# Patient Record
Sex: MALE | Race: WHITE
[De-identification: names, ages, dates, MRNs, and addresses within clinical notes are randomized per-mention and may not be internally consistent; named-entity substitution may affect disease eponyms.]

---

## 2017-11-17 ENCOUNTER — HOSPITAL ENCOUNTER (OUTPATIENT)
Dept: HOSPITAL 92 - RAD | Age: 57
Discharge: HOME | End: 2017-11-17
Attending: UROLOGY
Payer: COMMERCIAL

## 2017-11-17 DIAGNOSIS — R35.0: ICD-10-CM

## 2017-11-17 DIAGNOSIS — Z00.00: ICD-10-CM

## 2017-11-17 DIAGNOSIS — K80.80: ICD-10-CM

## 2017-11-17 DIAGNOSIS — N20.0: ICD-10-CM

## 2017-11-17 DIAGNOSIS — Z12.5: Primary | ICD-10-CM

## 2017-11-17 LAB
ALP SERPL-CCNC: 53 U/L (ref 40–150)
ALT SERPL W P-5'-P-CCNC: 19 U/L (ref 8–55)
AST SERPL-CCNC: 19 U/L (ref 5–34)
BASOPHILS # BLD AUTO: 0.1 THOU/UL (ref 0–0.2)
BASOPHILS NFR BLD AUTO: 1.4 % (ref 0–1)
BILIRUB DIRECT SERPL-MCNC: 0.3 MG/DL (ref 0.1–0.3)
BILIRUB SERPL-MCNC: 0.8 MG/DL (ref 0.2–1.2)
CHOLEST SERPL-MCNC: 158 MG/DL
EOSINOPHIL # BLD AUTO: 0.3 THOU/UL (ref 0–0.7)
EOSINOPHIL NFR BLD AUTO: 3 % (ref 0–10)
HCT VFR BLD CALC: 46.2 % (ref 42–52)
HYALINE CASTS #/AREA URNS LPF: (no result) LPF
LDLC SERPL CALC-MCNC: 86 MG/DL
LYMPHOCYTES # BLD: 3.4 THOU/UL (ref 1.2–3.4)
LYMPHOCYTES NFR BLD AUTO: 35.8 % (ref 21–51)
MONOCYTES # BLD AUTO: 0.7 THOU/UL (ref 0.11–0.59)
MONOCYTES NFR BLD AUTO: 7.1 % (ref 0–10)
NEUTROPHILS # BLD AUTO: 5 THOU/UL (ref 1.4–6.5)
RBC # BLD AUTO: 4.87 MILL/UL (ref 4.7–6.1)
RBC UR QL AUTO: (no result) HPF (ref 0–3)
TESTOST FREE SERPL-MCNC: 92.4 PG/ML (ref 47–244)
WBC # BLD AUTO: 9.5 THOU/UL (ref 4.8–10.8)

## 2017-11-17 PROCEDURE — 83036 HEMOGLOBIN GLYCOSYLATED A1C: CPT

## 2017-11-17 PROCEDURE — 80076 HEPATIC FUNCTION PANEL: CPT

## 2017-11-17 PROCEDURE — 84439 ASSAY OF FREE THYROXINE: CPT

## 2017-11-17 PROCEDURE — 85025 COMPLETE CBC W/AUTO DIFF WBC: CPT

## 2017-11-17 PROCEDURE — 87086 URINE CULTURE/COLONY COUNT: CPT

## 2017-11-17 PROCEDURE — 80061 LIPID PANEL: CPT

## 2017-11-17 PROCEDURE — G0103 PSA SCREENING: HCPCS

## 2017-11-17 PROCEDURE — 74000: CPT

## 2017-11-17 PROCEDURE — 81001 URINALYSIS AUTO W/SCOPE: CPT

## 2017-11-17 PROCEDURE — 84270 ASSAY OF SEX HORMONE GLOBUL: CPT

## 2017-11-17 PROCEDURE — 84443 ASSAY THYROID STIM HORMONE: CPT

## 2017-11-17 PROCEDURE — 84403 ASSAY OF TOTAL TESTOSTERONE: CPT

## 2017-11-17 PROCEDURE — 36415 COLL VENOUS BLD VENIPUNCTURE: CPT

## 2018-11-27 ENCOUNTER — HOSPITAL ENCOUNTER (OUTPATIENT)
Dept: HOSPITAL 92 - RAD | Age: 58
Discharge: HOME | End: 2018-11-27
Attending: UROLOGY
Payer: COMMERCIAL

## 2018-11-27 DIAGNOSIS — R19.8: ICD-10-CM

## 2018-11-27 DIAGNOSIS — Z12.5: Primary | ICD-10-CM

## 2018-11-27 DIAGNOSIS — N20.0: ICD-10-CM

## 2018-11-27 DIAGNOSIS — R35.0: ICD-10-CM

## 2018-11-27 PROCEDURE — 81003 URINALYSIS AUTO W/O SCOPE: CPT

## 2018-11-27 PROCEDURE — G0103 PSA SCREENING: HCPCS

## 2018-11-27 PROCEDURE — 80048 BASIC METABOLIC PNL TOTAL CA: CPT

## 2018-11-27 PROCEDURE — 36415 COLL VENOUS BLD VENIPUNCTURE: CPT

## 2018-11-27 PROCEDURE — 87086 URINE CULTURE/COLONY COUNT: CPT

## 2018-11-27 PROCEDURE — 74018 RADEX ABDOMEN 1 VIEW: CPT

## 2018-11-27 NOTE — RAD
ABDOMEN ONE VIEW:

 

COMPARISON:

11/17/2017

 

FINDINGS:

Stable calcification projecting over the right upper quadrant, which may be associated with the right
 renal pelvis versus gallstones.

 

There are a few nonspecific air-filled loops of small bowel, which are neither dilated nor distended.
 No pneumoperitoneum on the supine projection.

 

IMPRESSION:

Separate calcifications projecting over the right upper quadrant, which may represent calculi in the 
right renal pelvis versus gallstones.  These calculi measure approximately 1.6 and 1.7 cm.

 

POS: [facility]

## 2019-01-08 ENCOUNTER — HOSPITAL ENCOUNTER (OUTPATIENT)
Dept: HOSPITAL 92 - SCSULT | Age: 59
Discharge: HOME | End: 2019-01-08
Attending: SURGERY
Payer: COMMERCIAL

## 2019-01-08 DIAGNOSIS — R73.01: ICD-10-CM

## 2019-01-08 DIAGNOSIS — Z11.59: Primary | ICD-10-CM

## 2019-01-08 DIAGNOSIS — R10.11: ICD-10-CM

## 2019-01-08 DIAGNOSIS — K80.20: ICD-10-CM

## 2019-01-08 DIAGNOSIS — E78.5: ICD-10-CM

## 2019-01-08 DIAGNOSIS — K76.0: ICD-10-CM

## 2019-01-08 LAB
ALBUMIN SERPL BCG-MCNC: 4.4 G/DL (ref 3.5–5)
ALP SERPL-CCNC: 44 U/L (ref 40–150)
ALT SERPL W P-5'-P-CCNC: 20 U/L (ref 8–55)
AST SERPL-CCNC: 18 U/L (ref 5–34)
BILIRUB DIRECT SERPL-MCNC: 0.2 MG/DL (ref 0.1–0.3)
BILIRUB SERPL-MCNC: 0.7 MG/DL (ref 0.2–1.2)
CHD RISK SERPL-RTO: 5.6 (ref ?–4.5)
CHOLEST SERPL-MCNC: 256 MG/DL
HDLC SERPL-MCNC: 46 MG/DL
LDLC SERPL CALC-MCNC: 170 MG/DL
TRIGL SERPL-MCNC: 198 MG/DL (ref ?–150)

## 2019-01-08 PROCEDURE — 76705 ECHO EXAM OF ABDOMEN: CPT

## 2019-01-08 PROCEDURE — 80061 LIPID PANEL: CPT

## 2019-01-08 PROCEDURE — 83036 HEMOGLOBIN GLYCOSYLATED A1C: CPT

## 2019-01-08 PROCEDURE — 80076 HEPATIC FUNCTION PANEL: CPT

## 2019-01-08 PROCEDURE — 87521 HEPATITIS C PROBE&RVRS TRNSC: CPT

## 2019-01-08 PROCEDURE — 36415 COLL VENOUS BLD VENIPUNCTURE: CPT

## 2019-01-08 NOTE — ULT
GALLBLADDER ULTRASOUND:

 

History: Right upper quadrant pain. 

 

FINDINGS: 

Real-time imaging of the right upper quadrant shows an echogenic foci with shadowing within the gallb
ladder consistent with gallstones. Common duct is 4 mm. 

 

The liver is of increased echogenicity and measures 19.5 cm in length. Technologist reports a negativ
e ultrasound Street's sign. 

 

Pancreas is partially obscured. Right kidney is normal in size and not obstructed. 

 

IMPRESSION: 

1. Multiple cholelithiasis with a normal caliber common duct. 

2. Fatty changes of the liver which is borderline in size. 

 

POS: [facility]

## 2019-01-28 ENCOUNTER — HOSPITAL ENCOUNTER (OUTPATIENT)
Dept: HOSPITAL 92 - LABBT | Age: 59
Discharge: HOME | End: 2019-01-28
Attending: SURGERY
Payer: COMMERCIAL

## 2019-01-28 DIAGNOSIS — K80.20: ICD-10-CM

## 2019-01-28 DIAGNOSIS — Z01.818: Primary | ICD-10-CM

## 2019-01-28 LAB
ALBUMIN SERPL BCG-MCNC: 4.5 G/DL (ref 3.5–5)
ALP SERPL-CCNC: 49 U/L (ref 40–150)
ALT SERPL W P-5'-P-CCNC: 20 U/L (ref 8–55)
ANION GAP SERPL CALC-SCNC: 11 MMOL/L (ref 10–20)
AST SERPL-CCNC: 19 U/L (ref 5–34)
BASOPHILS # BLD AUTO: 0.2 THOU/UL (ref 0–0.2)
BASOPHILS NFR BLD AUTO: 2 % (ref 0–1)
BILIRUB DIRECT SERPL-MCNC: 0.1 MG/DL (ref 0.1–0.3)
BILIRUB SERPL-MCNC: 0.4 MG/DL (ref 0.2–1.2)
BUN SERPL-MCNC: 22 MG/DL (ref 8.4–25.7)
CALCIUM SERPL-MCNC: 9.9 MG/DL (ref 7.8–10.44)
CHLORIDE SERPL-SCNC: 105 MMOL/L (ref 98–107)
CO2 SERPL-SCNC: 27 MMOL/L (ref 22–29)
CREAT CL PREDICTED SERPL C-G-VRATE: 0 ML/MIN (ref 70–130)
EOSINOPHIL # BLD AUTO: 0.3 THOU/UL (ref 0–0.7)
EOSINOPHIL NFR BLD AUTO: 3.2 % (ref 0–10)
GLOBULIN SER CALC-MCNC: 3.2 G/DL (ref 2.4–3.5)
GLUCOSE SERPL-MCNC: 109 MG/DL (ref 70–105)
HGB BLD-MCNC: 15.6 G/DL (ref 14–18)
LYMPHOCYTES # BLD: 2.6 THOU/UL (ref 1.2–3.4)
LYMPHOCYTES NFR BLD AUTO: 33.4 % (ref 21–51)
MCH RBC QN AUTO: 30.2 PG (ref 27–31)
MCV RBC AUTO: 92.8 FL (ref 78–98)
MONOCYTES # BLD AUTO: 0.6 THOU/UL (ref 0.11–0.59)
MONOCYTES NFR BLD AUTO: 8 % (ref 0–10)
NEUTROPHILS # BLD AUTO: 4.2 THOU/UL (ref 1.4–6.5)
NEUTROPHILS NFR BLD AUTO: 53.4 % (ref 42–75)
PLATELET # BLD AUTO: 236 THOU/UL (ref 130–400)
POTASSIUM SERPL-SCNC: 4.1 MMOL/L (ref 3.5–5.1)
RBC # BLD AUTO: 5.15 MILL/UL (ref 4.7–6.1)
SODIUM SERPL-SCNC: 139 MMOL/L (ref 136–145)
WBC # BLD AUTO: 7.8 THOU/UL (ref 4.8–10.8)

## 2019-01-28 PROCEDURE — 85025 COMPLETE CBC W/AUTO DIFF WBC: CPT

## 2019-01-28 PROCEDURE — 93010 ELECTROCARDIOGRAM REPORT: CPT

## 2019-01-28 PROCEDURE — 93005 ELECTROCARDIOGRAM TRACING: CPT

## 2019-01-28 PROCEDURE — 80053 COMPREHEN METABOLIC PANEL: CPT

## 2019-01-28 PROCEDURE — 80076 HEPATIC FUNCTION PANEL: CPT

## 2019-01-28 NOTE — EKG
Test Reason : 

Blood Pressure : ***/*** mmHG

Vent. Rate : 071 BPM     Atrial Rate : 071 BPM

   P-R Int : 168 ms          QRS Dur : 092 ms

    QT Int : 386 ms       P-R-T Axes : 037 -04 035 degrees

   QTc Int : 419 ms

 

Normal sinus rhythm

Possible Inferior infarct , age undetermined

Abnormal ECG

No previous ECGs available

Confirmed by ISMAEL ALCALA, DR. PARKER ([id number]) on 1/28/2019 8:19:54 PM

 

Referred By:  MAGNUS           Confirmed By:DR. ELENA GUEVARA MD

## 2019-02-01 ENCOUNTER — HOSPITAL ENCOUNTER (OUTPATIENT)
Dept: HOSPITAL 92 - SDC | Age: 59
Discharge: HOME | End: 2019-02-01
Attending: SURGERY
Payer: COMMERCIAL

## 2019-02-01 VITALS — BODY MASS INDEX: 34.4 KG/M2

## 2019-02-01 DIAGNOSIS — I10: ICD-10-CM

## 2019-02-01 DIAGNOSIS — K80.10: Primary | ICD-10-CM

## 2019-02-01 DIAGNOSIS — Z79.899: ICD-10-CM

## 2019-02-01 DIAGNOSIS — E78.5: ICD-10-CM

## 2019-02-01 DIAGNOSIS — N52.9: ICD-10-CM

## 2019-02-01 DIAGNOSIS — K21.9: ICD-10-CM

## 2019-02-01 DIAGNOSIS — J45.909: ICD-10-CM

## 2019-02-01 PROCEDURE — 0FT44ZZ RESECTION OF GALLBLADDER, PERCUTANEOUS ENDOSCOPIC APPROACH: ICD-10-PCS | Performed by: SURGERY

## 2019-02-01 PROCEDURE — 88304 TISSUE EXAM BY PATHOLOGIST: CPT

## 2019-02-01 NOTE — OP
DATE OF PROCEDURE:  02/01/2019



PREOPERATIVE DIAGNOSIS:  Symptomatic cholelithiasis.



PROCEDURE PERFORMED:  Laparoscopic cholecystectomy.



INDICATIONS:  The patient is a 58-year-old male, who has been having episodic right

upper quadrant pain radiating to back, associated with nausea.  Ultrasound showed

cholelithiasis. 



FINDINGS:  Multiple stones, very tiny caliber cystic duct.



DESCRIPTION OF PROCEDURE:  After informed consent was obtained, the patient was

taken to the operating room and given general endotracheal anesthesia, placed in

supine position.  Abdomen was prepped and draped in usual fashion.  Local anesthesia

infiltrated subcutaneously and deep.  Subumbilical incision was performed.  Subcu

divided sharply.  The fascia was grasped with two stay sutures placed in each side

of midline.  Midline was incised.  Digital palpation revealed no local adhesions.  A

blunt 12-mm trocar inserted.  Pneumoperitoneum was created to a pressure of 15 mmHg.

 A 0-degree laparoscope was inserted under direct vision.  Three 5 mm ports were

placed subcostally.  Gallbladder was grasped and advanced superiorly.  Peritoneum

lysed distally to expose the cystic duct, artery, and critical view.  The artery and

duct were triply ligated with hemoclips and divided.  The gallbladder removed from

its fossa utilizing the electrocautery.  It was placed in endosac and removed from

the abdomen in the endosac.  Hemostasis assured.  The wound was irrigated.

Irrigation fluid removed.  Trocars and retractors removed.  The fascia was closed

with interrupted 2-0 Vicryl.  The skin closed with interrupted 4-0 Rapide.

Dermabond applied.  The patient tolerated the procedure well, transferred to

Recovery in good condition.  Sponge and needle count verified correct x2. 







Job ID:  [id number]

## 2019-12-11 ENCOUNTER — HOSPITAL ENCOUNTER (OUTPATIENT)
Dept: HOSPITAL 92 - SCSULT | Age: 59
Discharge: HOME | End: 2019-12-11
Attending: UROLOGY
Payer: COMMERCIAL

## 2019-12-11 DIAGNOSIS — N28.1: ICD-10-CM

## 2019-12-11 DIAGNOSIS — N20.0: Primary | ICD-10-CM

## 2019-12-11 PROCEDURE — 76770 US EXAM ABDO BACK WALL COMP: CPT

## 2019-12-11 PROCEDURE — 74018 RADEX ABDOMEN 1 VIEW: CPT

## 2019-12-11 NOTE — ULT
US Renal Bilateral STANDARD



History: Renal calcifications



Comparison: Renal ultrasound 2014



Findings: Real-time grayscale and color evaluation of the kidneys and urinary bladder was performed.



Right kidney measures 10.7 x 5.3 x 5.5 cm and the left kidney measures 11.3 x 6.5 x 7.5 cm. No renal 
mass or abnormal calcifications. No hydronephrosis. There is a 3.5 cm inferior pole simple cysts as

well as a mildly complex interpolar 1.5 cm cyst.



Urinary bladder is unremarkable.



Impression: 

1. Mildly complex left interpolar cyst for which follow-up CT or MRI renal protocol in 6 months recom
mended.

2. No abnormal calculi.

3. No evidence for obstructive uropathy.



Reported By: Emery Nichols 

Electronically Signed:  12/11/2019 2:56 PM

## 2019-12-12 NOTE — RAD
Exam: 2 views abdomen



HISTORY: Assess for renal calculi.



COMPARISON: 11/27/2018



FINDINGS: Nonspecific bowel gas pattern. Surgical clips in the right upper quadrant, compatible with 
previous cholecystectomy. No suspicious densities in the abdomen or pelvis. Right hemipelvic

phleboliths are noted. No osseous abnormalities.



IMPRESSION: Nonspecific bowel gas pattern. No radiographic evidence of nephrolithiasis or ureterolith
iasis.



Reported By: Kendrick Rodriguez 

Electronically Signed:  12/11/2019 4:52 PM

## 2020-03-23 ENCOUNTER — HOSPITAL ENCOUNTER (OUTPATIENT)
Dept: HOSPITAL 92 - SCSCT | Age: 60
Discharge: HOME | End: 2020-03-23
Attending: UROLOGY
Payer: COMMERCIAL

## 2020-03-23 DIAGNOSIS — N20.0: Primary | ICD-10-CM

## 2020-03-23 DIAGNOSIS — Z90.49: ICD-10-CM

## 2020-03-23 DIAGNOSIS — N28.89: ICD-10-CM

## 2020-03-23 DIAGNOSIS — K76.9: ICD-10-CM

## 2020-03-23 DIAGNOSIS — R35.0: ICD-10-CM

## 2020-03-23 DIAGNOSIS — K44.9: ICD-10-CM

## 2020-03-23 DIAGNOSIS — N28.1: ICD-10-CM

## 2020-03-23 LAB
ANION GAP SERPL CALC-SCNC: 15 MMOL/L (ref 10–20)
BUN SERPL-MCNC: 15 MG/DL (ref 8.4–25.7)
CALCIUM SERPL-MCNC: 9.4 MG/DL (ref 7.8–10.44)
CHLORIDE SERPL-SCNC: 103 MMOL/L (ref 98–107)
CO2 SERPL-SCNC: 26 MMOL/L (ref 22–29)
CREAT CL PREDICTED SERPL C-G-VRATE: 0 ML/MIN (ref 70–130)
GLUCOSE SERPL-MCNC: 104 MG/DL (ref 70–105)
POTASSIUM SERPL-SCNC: 4.5 MMOL/L (ref 3.5–5.1)
SODIUM SERPL-SCNC: 139 MMOL/L (ref 136–145)

## 2020-03-23 PROCEDURE — 74170 CT ABD WO CNTRST FLWD CNTRST: CPT

## 2020-03-23 PROCEDURE — 36415 COLL VENOUS BLD VENIPUNCTURE: CPT

## 2020-03-23 PROCEDURE — 80048 BASIC METABOLIC PNL TOTAL CA: CPT

## 2020-03-23 NOTE — CT
EXAM:

CT Abdomen W WO Con



PROVIDED CLINICAL HISTORY:

Complex left renal cyst.



COMPARISON:

Renal sonogram on 12/11/2019 and noncontrast CT abdomen and pelvis in 2014



FINDINGS:

Minimal linear scarring and/or atelectasis is present at each lung base.



Small hiatal hernia is present.



Postcholecystectomy changes are identified. A 2 cm low-density lesion is seen in the left hepatic lob
e superiorly and anteriorly also present on study in 2014. This cannot be characterized as a simple

cyst on this exam, but given this was also present on the prior study suggests a benign finding. Live
r otherwise has a normal CT appearance.



Nonobstructing superior pole left renal calculi are seen, largest calculus measuring approximately 3 
mm. No right renal calculus is visualized. A subcentimeter too small to characterize hypodense

lesion is seen in the midportion right kidney. A 2.6 cm hypodense fluid attenuation cystic lesion is 
seen in the inferior pole left kidney compatible with a cyst. This corresponds to findings on

recent ultrasound. Just superior to this region, in the midportion left kidney, there is a very small
 1.3 cm hypodense cystic appearing lesion also likely to a small cyst. No enhancing lesion is seen

in either kidney.



The spleen, pancreas, and bilateral adrenal glands demonstrate a normal CT appearance.



Vascular calcifications are seen in the abdominal aorta and right common iliac artery.



No free fluid, fluid collection, or lymphadenopathy seen in the abdomen or pelvis.



Colonic diverticulosis is present. Loops of small bowel are normal in caliber.



Degenerative changes are seen in the spine greatest involving the lower thoracic spine.



IMPRESSION:



1. Left renal cyst measuring 2.7 cm with smaller oval-shaped hypodense lesion midportion left kidney 
also likely representing a small cyst. No enhancing lesion is seen in the kidneys bilaterally.

2. Nonobstructing left renal calculi.

3. Hypodense lesion anterior and superior aspect of the left hepatic lobe which cannot be characteriz
ed as a simple cyst based on this exam but did appear to be present on prior study in 2014.

4. Postcholecystectomy changes.

5. Small hiatal hernia.

6. Colonic diverticulosis.



Reported By: Lamin Allred 

Electronically Signed:  3/23/2020 11:41 AM

## 2021-11-19 ENCOUNTER — HOSPITAL ENCOUNTER (OUTPATIENT)
Dept: HOSPITAL 92 - CSHULT | Age: 61
Discharge: HOME | End: 2021-11-19
Attending: UROLOGY
Payer: COMMERCIAL

## 2021-11-19 DIAGNOSIS — N20.0: Primary | ICD-10-CM

## 2021-11-19 DIAGNOSIS — N28.1: ICD-10-CM

## 2021-11-19 PROCEDURE — 74018 RADEX ABDOMEN 1 VIEW: CPT

## 2021-11-19 PROCEDURE — 76770 US EXAM ABDO BACK WALL COMP: CPT

## 2023-03-02 ENCOUNTER — HOSPITAL ENCOUNTER (OUTPATIENT)
Dept: HOSPITAL 92 - SCSCT | Age: 63
Discharge: HOME | End: 2023-03-02
Attending: UROLOGY
Payer: COMMERCIAL

## 2023-03-02 DIAGNOSIS — N20.0: Primary | ICD-10-CM

## 2023-03-02 DIAGNOSIS — R39.11: ICD-10-CM

## 2023-03-02 DIAGNOSIS — K57.30: ICD-10-CM

## 2023-03-02 DIAGNOSIS — K40.90: ICD-10-CM

## 2023-03-02 DIAGNOSIS — N28.1: ICD-10-CM

## 2023-03-02 DIAGNOSIS — K76.89: ICD-10-CM

## 2023-03-02 PROCEDURE — 74176 CT ABD & PELVIS W/O CONTRAST: CPT
